# Patient Record
(demographics unavailable — no encounter records)

---

## 2025-05-28 NOTE — PHYSICAL EXAM
[Alert] : alert [Oriented x 3] : ~L oriented x 3 [Well Nourished] : well nourished [FreeTextEntry3] : Type II skin  Upper nose: 9 x 8 mm skin colored plaque  Trunk: Mild to moderate brown macules and papules Back: Few brown verrucous papules  No other suspicious lesions seen

## 2025-05-28 NOTE — PLAN
[TextEntry] : Biopsy lesion on upper nose  Photo taken Verbal consent obtained  1% lidocaine with epinephrine Shave biopsy and curettage X 1 -some sclerosis present Aluminum chloride Vaseline  Wound care instructions given.  Patient to call in 2 weeks if not previously notified of results.  If biopsy is positive, may refer for Mohs surgery  If biopsy is negative, return as needed

## 2025-05-28 NOTE — HISTORY OF PRESENT ILLNESS
[FreeTextEntry1] : Growth on nose [de-identified] : First visit for 74-year-old white female presents with a lesion on the upper nose present for at least 1 year.  No history of skin cancer.

## 2025-07-14 NOTE — END OF VISIT
[] : Resident [FreeTextEntry3] : I personally saw and examined this patient with the resident physician and was present for the key portions of history taking, examination as well as any procedures performed .  I agree with the assessment and plan as documented in the resident's note unless noted below.   Pérez Garcia MD Physician, Dermatology and Dermatologic Surgery [facility]

## 2025-07-14 NOTE — END OF VISIT
[] : Resident [FreeTextEntry3] : I personally saw and examined this patient with the resident physician and was present for the key portions of history taking, examination as well as any procedures performed .  I agree with the assessment and plan as documented in the resident's note unless noted below.   Pérez Garcia MD Physician, Dermatology and Dermatologic Surgery [facility]

## 2025-07-14 NOTE — HISTORY OF PRESENT ILLNESS
[FreeTextEntry1] : Mohs surgery consult for superficial, nodular, infiltrative BCC on nose  [de-identified] : 07/14/2025  Referred by: Dr. Hanna  Ms. HAIR MCKEON is a 74 year old F who presents for Mohs surgery consult for superficial, nodular, infiltrative BCC on nose. Patient noticed the lesion as a bleeding spot that started appx 1 year ago. No past medical or family history of skin cancer    SH: Here with  Sonido  Upcoming travel plans: none   Pertinent positives noted below History of HIV or hepatitis: No Blood thinners: none Antibiotic Prophylaxis: None Medical implants: None   The patient's review of systems questionnaire was reviewed. Education needs were identified. There were no barriers to learning.

## 2025-07-14 NOTE — HISTORY OF PRESENT ILLNESS
[FreeTextEntry1] : Mohs surgery consult for superficial, nodular, infiltrative BCC on nose  [de-identified] : 07/14/2025  Referred by: Dr. Hanna  Ms. HAIR MCKEON is a 74 year old F who presents for Mohs surgery consult for superficial, nodular, infiltrative BCC on nose. Patient noticed the lesion as a bleeding spot that started appx 1 year ago. No past medical or family history of skin cancer    SH: Here with  Sonido  Upcoming travel plans: none   Pertinent positives noted below History of HIV or hepatitis: No Blood thinners: none Antibiotic Prophylaxis: None Medical implants: None   The patient's review of systems questionnaire was reviewed. Education needs were identified. There were no barriers to learning.

## 2025-07-14 NOTE — ASSESSMENT
[FreeTextEntry1] : Mohs surgery consultation for Infiltrative BCC Upper Nose  -- I explained the treatment options of topical immunomodulatory or chemotherapy, electrodessication and curettage, radiation therapy, excision and Mohs surgery.  I recommended Mohs surgery due to the tumor type, location, and ill-defined nature of cancer.   Mohs Appropriate Use Criteria (AUC) Score:9  I explained that following extirpation there will be a full thickness defect of the involved area. The reconstructive options will be based on the defect size and surrounding tissue laxity of the involved area. Primary closure is only possible for smaller defects. For larger defects, local tissue rearrangement or skin grafting may be necessary. Risks following layered primary closure or local tissue rearrangement include wound dehiscence, contour irregularity, bleeding, infection, and paresthesia (nerve damage including sensory deficit or motor weakness). Risks following skin grafting include wound dehiscence, skin graft nonadherence (partial or complete), contour irregularity, bleeding, infection, paresthesia, and donor site complications. I explained that the patient will need to abstain from physical activity for 1-2 weeks following the surgery, that they would heal with a scar, and also discussed the chances of infection, bleeding, potential sensory or motor nerve damage, and recurrence.  The patient indicated that s/he understood the risks and wished to schedule the procedure. -- In particular, for reconstruction we discussed coordination with Dr Howell in Plastics for repair  Thank you for this Mohs surgery referral. We recommended that Ms. HAIR MCKEON follow up with Her referring dermatologist for routine skin exams following surgery.   Pérez Garcia MD Physician, Dermatology & Dermatologic Surgery [facility]

## 2025-07-14 NOTE — PHYSICAL EXAM
[Alert] : alert [Oriented x 3] : ~L oriented x 3 [Well Nourished] : well nourished [Conjunctiva Non-injected] : conjunctiva non-injected [No Visual Lymphadenopathy] : no visual  lymphadenopathy [No Clubbing] : no clubbing [No Edema] : no edema [No Bromhidrosis] : no bromhidrosis [No Chromhidrosis] : no chromhidrosis [Hair] : Hair [Scalp] : Scalp [Face] : Face [Nose] : Nose [Eyelids] : Eyelids [Ears] : Ears [Lips] : Lips [Neck] : Neck [Nodes] : Nodes [FreeTextEntry3] : pink healed biopsy scar with peripheral pearly nodularity on upper nose---1.3x1.0cm  neg head/neck lymphadenopathy

## 2025-07-14 NOTE — ASSESSMENT
[FreeTextEntry1] : Mohs surgery consultation for Infiltrative BCC Upper Nose  -- I explained the treatment options of topical immunomodulatory or chemotherapy, electrodessication and curettage, radiation therapy, excision and Mohs surgery.  I recommended Mohs surgery due to the tumor type, location, and ill-defined nature of cancer.   Mohs Appropriate Use Criteria (AUC) Score:9  I explained that following extirpation there will be a full thickness defect of the involved area. The reconstructive options will be based on the defect size and surrounding tissue laxity of the involved area. Primary closure is only possible for smaller defects. For larger defects, local tissue rearrangement or skin grafting may be necessary. Risks following layered primary closure or local tissue rearrangement include wound dehiscence, contour irregularity, bleeding, infection, and paresthesia (nerve damage including sensory deficit or motor weakness). Risks following skin grafting include wound dehiscence, skin graft nonadherence (partial or complete), contour irregularity, bleeding, infection, paresthesia, and donor site complications. I explained that the patient will need to abstain from physical activity for 1-2 weeks following the surgery, that they would heal with a scar, and also discussed the chances of infection, bleeding, potential sensory or motor nerve damage, and recurrence.  The patient indicated that s/he understood the risks and wished to schedule the procedure. -- In particular, for reconstruction we discussed coordination with Dr Howell in Plastics for repair  Thank you for this Mohs surgery referral. We recommended that Ms. HAIR MCKEON follow up with Her referring dermatologist for routine skin exams following surgery.   Pérez Garcia MD Physician, Dermatology & Dermatologic Surgery [facility]

## 2025-07-23 NOTE — ASSESSMENT
[FreeTextEntry1] : As above patient doing well continue current meds [Vaccines Reviewed] : Immunizations reviewed today. Please see immunization details in the vaccine log within the immunization flowsheet.

## 2025-07-23 NOTE — HISTORY OF PRESENT ILLNESS
[FreeTextEntry1] : Yearly physical [de-identified] : No complaints.  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  No blood clots  Asymptomatic PVCs pulse is somewhat irregular patient denies palpitations  Lobular carcinoma in situ patient recent MRI of breast negative on raloxifene.  Followed yearly by OB/GYN mammogram every 6 months  Nephrolithiasis followed by Dr. Mancilla

## 2025-07-23 NOTE — PHYSICAL EXAM
[No Murmur] : no murmur heard [No Carotid Bruits] : no carotid bruits [Pedal Pulses Present] : the pedal pulses are present [Normal] : no posterior cervical lymphadenopathy and no anterior cervical lymphadenopathy [de-identified] : Pulse is irregular [de-identified] : Arthritic deformity knees

## 2025-07-23 NOTE — HEALTH RISK ASSESSMENT
[Very Good] : ~his/her~  mood as very good [Yes] : Yes [No falls in past year] : Patient reported no falls in the past year [Assistive Device] : Patient uses an assistive device [PHQ-2 Negative - No further assessment needed] : PHQ-2 Negative - No further assessment needed [de-identified] : Once a week [de-identified] : Cane [Never] : Never [Patient reported mammogram was normal] : Patient reported mammogram was normal [Patient reported colonoscopy was normal] : Patient reported colonoscopy was normal [Change in mental status noted] : No change in mental status noted [Sexually Active] : sexually active [Fully functional (bathing, dressing, toileting, transferring, walking, feeding)] : Fully functional (bathing, dressing, toileting, transferring, walking, feeding) [Fully functional (using the telephone, shopping, preparing meals, housekeeping, doing laundry, using] : Fully functional and needs no help or supervision to perform IADLs (using the telephone, shopping, preparing meals, housekeeping, doing laundry, using transportation, managing medications and managing finances) [Seat Belt] :  uses seat belt [MammogramDate] : 04/25 [ColonoscopyDate] : 10/19